# Patient Record
Sex: MALE | Race: WHITE | NOT HISPANIC OR LATINO | Employment: FULL TIME | URBAN - NONMETROPOLITAN AREA
[De-identification: names, ages, dates, MRNs, and addresses within clinical notes are randomized per-mention and may not be internally consistent; named-entity substitution may affect disease eponyms.]

---

## 2023-09-12 ENCOUNTER — OFFICE VISIT (OUTPATIENT)
Dept: PRIMARY CARE | Facility: CLINIC | Age: 23
End: 2023-09-12
Payer: COMMERCIAL

## 2023-09-12 VITALS
HEART RATE: 69 BPM | OXYGEN SATURATION: 98 % | SYSTOLIC BLOOD PRESSURE: 124 MMHG | BODY MASS INDEX: 40.46 KG/M2 | WEIGHT: 282.6 LBS | HEIGHT: 70 IN | DIASTOLIC BLOOD PRESSURE: 78 MMHG

## 2023-09-12 DIAGNOSIS — Z23 NEED FOR VACCINATION: ICD-10-CM
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
## 2023-09-12 DIAGNOSIS — S91.109A: Primary | ICD-10-CM

## 2023-09-12 PROCEDURE — 90471 IMMUNIZATION ADMIN: CPT

## 2023-09-12 PROCEDURE — 99213 OFFICE O/P EST LOW 20 MIN: CPT

## 2023-09-12 PROCEDURE — 90715 TDAP VACCINE 7 YRS/> IM: CPT

## 2023-09-12 RX ORDER — CIPROFLOXACIN 500 MG/1
750 TABLET ORAL 2 TIMES DAILY
Qty: 15 TABLET | Refills: 0 | Status: SHIPPED | OUTPATIENT
Start: 2023-09-12 | End: 2023-09-17

## 2023-09-12 NOTE — PROGRESS NOTES
"Subjective   Patient ID: Nik Hidalgo is a 23 y.o. male who presents for Puncture Wound (R foot).  HPI  Nik says he stepped on a nail last Tuesday.   Got nail out but since he stepped on it he has felt aching.  Got worse over the weekend.   Second toe swelled up and hurts.   Also over top of the foot hurts.  No numbness or tingling.  Cannot walk on it normally due to pain.  Pretty sure nail was anahi.  Had shoes on when it happened.  Tried epsom salt soak.  Warm milk and bread on it as well - did not hurt last night so seemed to help.  Betadine on it to clean it.  No drainage out of it.  Some reduced sensation in R foot 2nd toe due to swelling.     No past surgical history on file.   Past Medical History:   Diagnosis Date    Cutaneous abscess of abdominal wall 04/05/2021    Abscess of abdominal wall    Pain in left wrist 11/19/2018    Left wrist pain    Pain in right wrist 11/19/2018    Right wrist pain    Unspecified contact dermatitis due to other agents 11/19/2018    Contact dermatitis due to other agent    Unspecified fracture of the lower end of unspecified radius, initial encounter for closed fracture 11/19/2018    Distal radius fracture     Social History     Tobacco Use    Smoking status: Every Day     Packs/day: 1.5     Types: Cigarettes    Smokeless tobacco: Never   Substance Use Topics    Alcohol use: Never    Drug use: Never      Review of Systems  10 point review of systems performed and is negative except as noted in the HPI.    Current Outpatient Medications:     none    Objective   /78   Pulse 69   Ht 1.778 m (5' 10\")   Wt 128 kg (282 lb 9.6 oz)   SpO2 98%   BMI 40.55 kg/m²     Physical Exam  Musculoskeletal:      Right ankle: No swelling. No tenderness. Normal range of motion.      Left ankle: Normal. No swelling. No tenderness. Normal range of motion.      Left foot: Normal. Normal range of motion and normal capillary refill. No swelling or tenderness. Normal pulse.      Comments: " R Foot - healed area where nail went through plantar surface of R foot, no signs of infection where nail had entered skin. Second toe is erythematous, swollen, and tender to the touch. Erythema also present at base of metatarsals 1-4, tender when palpated, swelling present.   ROM intact in R toes but limited, especially in 2nd toe.   Remainder of foot is non-tender, ROM intact.   Dorsalis pedis and posterior tibalis pulses intact 2+. Capillary refill intact. Neurovascularly intact.       Assessment/Plan   Problem List Items Addressed This Visit    None  Visit Diagnoses       Wound of toe due to metal nail    -  Primary    Relevant Medications    ciprofloxacin (Cipro) 500 mg tablet    Other Relevant Orders    Tdap vaccine, age 10 years and older (BOOSTRIX) (Completed)    Need for vaccination        Relevant Orders    Tdap vaccine, age 10 years and older (BOOSTRIX) (Completed)        Start cipro   TDAP updated     Discussed at visit any disease processes that were of concern as well as the risks, benefits and instructions on any new medication provided. Patient (and/or caretaker of patient if present) stated all questions were answered, and they voiced understanding of instructions.

## 2023-09-13 PROBLEM — M25.561 ACUTE PAIN OF RIGHT KNEE: Status: RESOLVED | Noted: 2023-09-13 | Resolved: 2023-09-13

## 2023-09-13 PROBLEM — R53.83 FATIGUE: Status: RESOLVED | Noted: 2023-09-13 | Resolved: 2023-09-13

## 2023-09-13 PROBLEM — L02.211 ABSCESS OF ABDOMINAL WALL: Status: RESOLVED | Noted: 2023-09-13 | Resolved: 2023-09-13

## 2023-09-13 PROBLEM — S86.911A STRAIN OF RIGHT KNEE: Status: RESOLVED | Noted: 2023-09-13 | Resolved: 2023-09-13

## 2024-05-03 ENCOUNTER — OFFICE VISIT (OUTPATIENT)
Dept: PRIMARY CARE | Facility: CLINIC | Age: 24
End: 2024-05-03
Payer: COMMERCIAL

## 2024-05-03 VITALS
BODY MASS INDEX: 41.67 KG/M2 | SYSTOLIC BLOOD PRESSURE: 130 MMHG | DIASTOLIC BLOOD PRESSURE: 80 MMHG | OXYGEN SATURATION: 92 % | HEART RATE: 70 BPM | WEIGHT: 290.38 LBS

## 2024-05-03 DIAGNOSIS — F32.A DEPRESSION, UNSPECIFIED DEPRESSION TYPE: ICD-10-CM

## 2024-05-03 DIAGNOSIS — B95.8 STAPH INFECTION: Primary | ICD-10-CM

## 2024-05-03 PROCEDURE — 99213 OFFICE O/P EST LOW 20 MIN: CPT

## 2024-05-03 RX ORDER — SERTRALINE HYDROCHLORIDE 50 MG/1
50 TABLET, FILM COATED ORAL DAILY
Qty: 30 TABLET | Refills: 1 | Status: SHIPPED | OUTPATIENT
Start: 2024-05-03 | End: 2024-07-02

## 2024-05-03 RX ORDER — SERTRALINE HYDROCHLORIDE 25 MG/1
25 TABLET, FILM COATED ORAL DAILY
Qty: 7 TABLET | Refills: 0 | Status: SHIPPED | OUTPATIENT
Start: 2024-05-03 | End: 2024-05-10

## 2024-05-03 RX ORDER — MUPIROCIN 20 MG/G
OINTMENT TOPICAL 3 TIMES DAILY
Qty: 22 G | Refills: 0 | Status: SHIPPED | OUTPATIENT
Start: 2024-05-03 | End: 2024-05-13

## 2024-05-03 RX ORDER — SULFAMETHOXAZOLE AND TRIMETHOPRIM 800; 160 MG/1; MG/1
1 TABLET ORAL 2 TIMES DAILY
Qty: 20 TABLET | Refills: 0 | Status: SHIPPED | OUTPATIENT
Start: 2024-05-03 | End: 2024-05-13

## 2024-05-03 NOTE — PROGRESS NOTES
Subjective   Patient ID: Nik Hidalgo is a 23 y.o. male who presents for Follow-up (Possible staph infection on  inner thigh and upper leg).  HPI  Nik presents for several concerns:     First, around his waist he gets bigger lumps that open up - green stuff comes out, it heals, then scars  Hurts, relief when open up and drains  No fever   No one else has at home that he knows of   Before they open he puts on B&W and a bandaid   Sometimes itchy the next day     Also area on thighs, it is different it looks like a boil or cyst   Sometimes hurt     Would also like medication for depression   Feels like head is not in the right place  Feels short tempered at times  Sometimes harder to get along with people   Denies down or hopelessness  Sleep - tired after 8-10 hours of sleep   Appetite is normal   No SI or self harm thoughts       No past surgical history on file.   Past Medical History:   Diagnosis Date    Abscess of abdominal wall 09/13/2023    Acute pain of right knee 09/13/2023    Cutaneous abscess of abdominal wall 04/05/2021    Abscess of abdominal wall    Fatigue 09/13/2023    Pain in left wrist 11/19/2018    Left wrist pain    Pain in right wrist 11/19/2018    Right wrist pain    Strain of right knee 09/13/2023    Unspecified contact dermatitis due to other agents 11/19/2018    Contact dermatitis due to other agent    Unspecified fracture of the lower end of unspecified radius, initial encounter for closed fracture 11/19/2018    Distal radius fracture     Social History     Tobacco Use    Smoking status: Every Day     Current packs/day: 1.50     Types: Cigarettes    Smokeless tobacco: Never   Substance Use Topics    Alcohol use: Never    Drug use: Never        Review of Systems  10 point review of systems performed and is negative except as noted in the HPI.      Current Outpatient Medications:     mupirocin (Bactroban) 2 % ointment, Apply topically 3 times a day for 10 days. apply to affected area, Disp:  22 g, Rfl: 0    sertraline (Zoloft) 25 mg tablet, Take 1 tablet (25 mg) by mouth once daily for 7 days., Disp: 7 tablet, Rfl: 0    sertraline (Zoloft) 50 mg tablet, Take 1 tablet (50 mg) by mouth once daily., Disp: 30 tablet, Rfl: 1    sulfamethoxazole-trimethoprim (Bactrim DS) 800-160 mg tablet, Take 1 tablet by mouth 2 times a day for 10 days., Disp: 20 tablet, Rfl: 0     Objective   /80   Pulse 70   Wt 132 kg (290 lb 6.1 oz)   SpO2 92%   BMI 41.67 kg/m²     Physical Exam  Constitutional:       General: He is not in acute distress.     Appearance: Normal appearance. He is obese. He is not ill-appearing.   HENT:      Head: Normocephalic and atraumatic.   Skin:            Comments: Healed lesions present along waist line - some violet in color. New pustules also present, none are open or draining but have fluctuance.   Lesions also present on L inner thigh, appeared to be healed but mildly tender to palpation.   Also small flesh colored lesion on upper L chest, no drainage.    Neurological:      Mental Status: He is alert and oriented to person, place, and time.   Psychiatric:         Attention and Perception: Attention and perception normal.         Mood and Affect: Affect normal. Mood is depressed. Mood is not anxious.         Speech: Speech normal.         Behavior: Behavior normal. Behavior is cooperative.         Thought Content: Thought content normal. Thought content does not include homicidal or suicidal ideation.         Judgment: Judgment normal.         Assessment/Plan   Problem List Items Addressed This Visit    None  Visit Diagnoses       Staph infection    -  Primary    Relevant Medications    sulfamethoxazole-trimethoprim (Bactrim DS) 800-160 mg tablet    mupirocin (Bactroban) 2 % ointment    Depression, unspecified depression type        Relevant Medications    sertraline (Zoloft) 25 mg tablet    sertraline (Zoloft) 50 mg tablet          Staph infection   Bactrim now   If new spots appear  try mupirocin after completion of bactrim     Depression   Start sertraline 25mg for 1 week then 50  Follow up in 4-6 weeks to see how he is doing       Discussed at visit any disease processes that were of concern as well as the risks, benefits and instructions on any new medication provided. Patient (and/or caretaker of patient if present) stated all questions were answered, and they voiced understanding of instructions.

## 2024-05-03 NOTE — PATIENT INSTRUCTIONS
Start the antibiotic   Use the ointment when you see a new spot forming   If no improvement then come back and be seen again, may need biopsy  Also keep an eye on the spot on your chest, if it does not improve then need a biopsy of that area     Start sertraline 25mg once a day for a week  Then start sertraline 50mg one daily   Follow up in 4-6 weeks   Can take 4-6 weeks to fully notice an effect

## 2024-07-06 DIAGNOSIS — F32.A DEPRESSION, UNSPECIFIED DEPRESSION TYPE: ICD-10-CM

## 2024-07-08 RX ORDER — SERTRALINE HYDROCHLORIDE 50 MG/1
50 TABLET, FILM COATED ORAL DAILY
Qty: 30 TABLET | Refills: 1 | Status: SHIPPED | OUTPATIENT
Start: 2024-07-08

## 2024-09-23 DIAGNOSIS — F32.A DEPRESSION, UNSPECIFIED DEPRESSION TYPE: ICD-10-CM

## 2024-09-24 RX ORDER — SERTRALINE HYDROCHLORIDE 50 MG/1
50 TABLET, FILM COATED ORAL DAILY
Qty: 90 TABLET | Refills: 1 | Status: SHIPPED | OUTPATIENT
Start: 2024-09-24

## 2025-06-27 DIAGNOSIS — F32.A DEPRESSION, UNSPECIFIED DEPRESSION TYPE: ICD-10-CM

## 2025-06-30 RX ORDER — SERTRALINE HYDROCHLORIDE 50 MG/1
50 TABLET, FILM COATED ORAL DAILY
Qty: 90 TABLET | Refills: 0 | OUTPATIENT
Start: 2025-06-30

## 2025-07-01 DIAGNOSIS — F32.A DEPRESSION, UNSPECIFIED DEPRESSION TYPE: ICD-10-CM

## 2025-07-01 RX ORDER — SERTRALINE HYDROCHLORIDE 50 MG/1
50 TABLET, FILM COATED ORAL DAILY
Qty: 30 TABLET | Refills: 0 | Status: SHIPPED | OUTPATIENT
Start: 2025-07-01

## 2025-07-07 ENCOUNTER — APPOINTMENT (OUTPATIENT)
Dept: PRIMARY CARE | Facility: CLINIC | Age: 25
End: 2025-07-07
Payer: COMMERCIAL

## 2025-07-07 VITALS
HEART RATE: 80 BPM | SYSTOLIC BLOOD PRESSURE: 115 MMHG | WEIGHT: 307 LBS | OXYGEN SATURATION: 93 % | HEIGHT: 70 IN | BODY MASS INDEX: 43.95 KG/M2 | DIASTOLIC BLOOD PRESSURE: 79 MMHG

## 2025-07-07 DIAGNOSIS — F32.A DEPRESSION, UNSPECIFIED DEPRESSION TYPE: Primary | ICD-10-CM

## 2025-07-07 DIAGNOSIS — R10.84 GENERALIZED ABDOMINAL DISCOMFORT: ICD-10-CM

## 2025-07-07 DIAGNOSIS — R19.7 DIARRHEA, UNSPECIFIED TYPE: ICD-10-CM

## 2025-07-07 PROCEDURE — 99214 OFFICE O/P EST MOD 30 MIN: CPT

## 2025-07-07 PROCEDURE — G8433 SCR FOR DEP NOT CPT DOC RSN: HCPCS

## 2025-07-07 PROCEDURE — 3008F BODY MASS INDEX DOCD: CPT

## 2025-07-07 RX ORDER — PANTOPRAZOLE SODIUM 40 MG/1
40 TABLET, DELAYED RELEASE ORAL DAILY
Qty: 30 TABLET | Refills: 1 | Status: SHIPPED | OUTPATIENT
Start: 2025-07-07 | End: 2025-09-05

## 2025-07-07 RX ORDER — SERTRALINE HYDROCHLORIDE 100 MG/1
100 TABLET, FILM COATED ORAL DAILY
Qty: 30 TABLET | Refills: 1 | Status: SHIPPED | OUTPATIENT
Start: 2025-07-07 | End: 2025-09-05

## 2025-07-07 ASSESSMENT — PATIENT HEALTH QUESTIONNAIRE - PHQ9
1. LITTLE INTEREST OR PLEASURE IN DOING THINGS: SEVERAL DAYS
SUM OF ALL RESPONSES TO PHQ9 QUESTIONS 1 AND 2: 2
2. FEELING DOWN, DEPRESSED OR HOPELESS: SEVERAL DAYS

## 2025-07-07 NOTE — PATIENT INSTRUCTIONS
Stool studies - bring these back as soon as you can     Try pantoprazole once a day, see I this helps, if it does then it is likely related to reflux, if not then it is not     Try no dairy in your diet for 1 week and see if this helps     Continue probiotics     Increase sertraline to 100mg one a day   1 month follow up

## 2025-07-07 NOTE — PROGRESS NOTES
"Subjective   Patient ID: Nik Hidalgo is a 25 y.o. male who presents for Annual Exam and Abdominal Pain (2 months having loose bm and more times a day and he is taking probiotics ).  HPI  Nik presents for abdominal pain for 2 months or so   -It is 5 days a week   -It comes and goes   -Pain is right in the center, does not move  -The pain feels like how it is when you are nervous but more intense   -Not related to eating, waits until after it is gone to eat   -Taking some probiotics (unknown name) they have helped   -Sometimes feels like he could take tums but has not tried   -Some times gets diarrhea, not every time he gets the pain, first diarrhea was every day, now it is 3-4 times a week   -Still going to the bathroom more than usual   -Has not noticed blood, denies changes in color   -No change in diet, no recent travel   -Does not drink raw milk   -Is not around chickens or other animals, just dogs   -No known colon CA in the family  -Tried cutting out gluten and it did not help   -Some reflux but not worse than normal   -No fevers, no chills   -Has thrown up, yesterday morning a couple minutes after eating had to go throw up   -A month ago, took 3 bites out of burger had to throw up   -Did not feel nauseous and does not   -Denies feeling food get stuck     2 weeks ago was on doxycyline     Depression   -Last 2 months his medication seems to not be working as well  -More stressed   -Not as easy to stay calm   -Was working for awhile   -No side effects   -No SI     Surgical History[1]   Medical History[2]  Social History[3]     Review of Systems  10 point review of systems performed and is negative except as noted in the HPI.    Current Medications[4]     Objective   /79   Pulse 80   Ht 1.778 m (5' 10\")   Wt 139 kg (307 lb)   SpO2 93%   BMI 44.05 kg/m²     Physical Exam  Constitutional:       Appearance: Normal appearance.   HENT:      Head: Normocephalic and atraumatic.      Nose: Nose normal. "      Mouth/Throat:      Mouth: Mucous membranes are moist.      Pharynx: Oropharynx is clear.   Eyes:      Conjunctiva/sclera: Conjunctivae normal.      Pupils: Pupils are equal, round, and reactive to light.   Cardiovascular:      Rate and Rhythm: Normal rate and regular rhythm.      Heart sounds: Normal heart sounds.   Pulmonary:      Effort: Pulmonary effort is normal.      Breath sounds: Normal breath sounds. No wheezing, rhonchi or rales.   Abdominal:      General: Bowel sounds are normal.      Palpations: Abdomen is soft.      Tenderness: There is no abdominal tenderness. There is no guarding or rebound. Negative signs include Rovsing's sign.   Musculoskeletal:         General: Normal range of motion.   Skin:     General: Skin is warm and dry.   Neurological:      Mental Status: He is alert and oriented to person, place, and time.   Psychiatric:         Attention and Perception: Attention normal.         Mood and Affect: Mood and affect normal.         Speech: Speech normal.         Behavior: Behavior normal. Behavior is cooperative.         Thought Content: Thought content normal. Thought content does not include homicidal or suicidal ideation.         Judgment: Judgment normal.         Assessment & Plan  Depression, unspecified depression type  Increase sertraline to 100mg, 50mg was working well in the past   1 month follow up   Orders:    sertraline (Zoloft) 100 mg tablet; Take 1 tablet (100 mg) by mouth once daily.    Diarrhea, unspecified type  Discussed stool studies first, given recent antibiotic use we do need to do c-diff studies   Also discussed trying an elimination diet, cut out dairy for 1 week and see if there is any improvement in symptoms  Continue probiotics   Orders:    C. difficile, PCR; Future    Stool Pathogen Panel, PCR; Future    Fecal Fat, Quantitative; Future    Calprotectin Stool; Future    Ova/Para + Giardia/Cryptosporidium Antigen; Future    Generalized abdominal discomfort  Did  discuss reflux, will try protonix to see if this helps at all  Also consider bentyl   Also consider abdominal US or CT if no improvement   Orders:    pantoprazole (ProtoNix) 40 mg EC tablet; Take 1 tablet (40 mg) by mouth once daily. Do not crush, chew, or split.          Discussed at visit any disease processes that were of concern as well as the risks, benefits and instructions on any new medication provided. Patient (and/or caretaker of patient if present) stated all questions were answered, and they voiced understanding of instructions.      Charmaine Maldonado PA-C       [1] History reviewed. No pertinent surgical history.  [2]   Past Medical History:  Diagnosis Date    Abscess of abdominal wall 09/13/2023    Acute pain of right knee 09/13/2023    Cutaneous abscess of abdominal wall 04/05/2021    Abscess of abdominal wall    Fatigue 09/13/2023    Pain in left wrist 11/19/2018    Left wrist pain    Pain in right wrist 11/19/2018    Right wrist pain    Strain of right knee 09/13/2023    Unspecified contact dermatitis due to other agents 11/19/2018    Contact dermatitis due to other agent    Unspecified fracture of the lower end of unspecified radius, initial encounter for closed fracture 11/19/2018    Distal radius fracture   [3]   Social History  Tobacco Use    Smoking status: Every Day     Current packs/day: 1.50     Types: Cigarettes    Smokeless tobacco: Never   Vaping Use    Vaping status: Some Days   Substance Use Topics    Alcohol use: Never    Drug use: Never   [4]   Current Outpatient Medications:     pantoprazole (ProtoNix) 40 mg EC tablet, Take 1 tablet (40 mg) by mouth once daily. Do not crush, chew, or split., Disp: 30 tablet, Rfl: 1    sertraline (Zoloft) 100 mg tablet, Take 1 tablet (100 mg) by mouth once daily., Disp: 30 tablet, Rfl: 1

## 2025-08-05 ENCOUNTER — APPOINTMENT (OUTPATIENT)
Dept: PRIMARY CARE | Facility: CLINIC | Age: 25
End: 2025-08-05
Payer: COMMERCIAL

## 2025-08-05 DIAGNOSIS — R10.84 GENERALIZED ABDOMINAL DISCOMFORT: ICD-10-CM

## 2025-08-05 DIAGNOSIS — F32.A DEPRESSION, UNSPECIFIED DEPRESSION TYPE: ICD-10-CM

## 2025-08-05 PROCEDURE — 98012 SYNCH AUDIO-ONLY EST SF 10: CPT

## 2025-08-05 RX ORDER — PANTOPRAZOLE SODIUM 40 MG/1
40 TABLET, DELAYED RELEASE ORAL DAILY
Qty: 90 TABLET | Refills: 3 | Status: SHIPPED | OUTPATIENT
Start: 2025-08-05 | End: 2026-07-31

## 2025-08-05 RX ORDER — SERTRALINE HYDROCHLORIDE 100 MG/1
100 TABLET, FILM COATED ORAL DAILY
Qty: 90 TABLET | Refills: 3 | Status: SHIPPED | OUTPATIENT
Start: 2025-08-05 | End: 2026-07-31

## 2025-08-05 NOTE — PROGRESS NOTES
Subjective   Patient ID: Nik Hidalgo is a 25 y.o. male who presents for Depression and stomach discomfort.  HPI  Nik presents via phone visit for 1 month follow up   -At his last visit we increased sertraline to 100mg  -It is working well   -No side effects   -Stress level is better   -Sleep is good     Diarrhea and stomach discomfort   -Has improved a lot   -Protonix and probiotics, both are providing relief   -No diarrhea episodes   -Every once and awhile gets stomach discomfort   -No reflux      Current Medications[1]   Surgical History[2]   Medical History[3]  Social History[4]   Family History[5]   Review of Systems  10 point ROS negative except as otherwise noted in the HPI.      Objective   There were no vitals taken for this visit.   Physical Exam  Limited exam due to nature of virtual visit.      Assessment/Plan   Problem List Items Addressed This Visit    None  Visit Diagnoses         Depression, unspecified depression type        Relevant Medications    sertraline (Zoloft) 100 mg tablet - continue       Generalized abdominal discomfort        Relevant Medications    pantoprazole (ProtoNix) 40 mg EC tablet - continue  -did discuss a further work up but he is getting relief with PPI, discussed GERD  -follow up in person if not improving             Virtual or Telephone Consent - Verbal consent was requested and obtained from the patient on this date for a telehealth visit. The patient was informed about the telehealth clinical encounter including benefits to avoiding travel, limitations to the assessment, and billing for the service. In person care may be recommended if needed. Telehealth sessions are not being recorded and personal health information is protected. All questions were answered and verbal informal consent was obtained from the patient to proceed.    While technically available, the patient was unable or unwilling to consent to connect via audio/video telehealth technology; therefore, I  performed this visit using a real-time audio only connection between Nik Hidalgo & Charmaine Maldonado PA-C.  Verbal consent was requested and obtained from Nik Hidalgo on this date, 8/5/25 for a telehealth visit and the patient's location was confirmed at the time of the visit.       Total time spent reviewing the patient's chart, on the phone with the patient, and documenting: 10 minutes    Discussed at visit any disease processes that were of concern as well as the risks, benefits and instructions on any new medication provided. Patient (and/or caretaker of patient if present) stated all questions were answered, and they voiced understanding of instructions.     Charmaine Maldonado PA-C          [1]   Current Outpatient Medications:     pantoprazole (ProtoNix) 40 mg EC tablet, Take 1 tablet (40 mg) by mouth once daily. Do not crush, chew, or split., Disp: 90 tablet, Rfl: 3    sertraline (Zoloft) 100 mg tablet, Take 1 tablet (100 mg) by mouth once daily., Disp: 90 tablet, Rfl: 3  [2] No past surgical history on file.  [3]   Past Medical History:  Diagnosis Date    Abscess of abdominal wall 09/13/2023    Acute pain of right knee 09/13/2023    Cutaneous abscess of abdominal wall 04/05/2021    Abscess of abdominal wall    Fatigue 09/13/2023    Pain in left wrist 11/19/2018    Left wrist pain    Pain in right wrist 11/19/2018    Right wrist pain    Strain of right knee 09/13/2023    Unspecified contact dermatitis due to other agents 11/19/2018    Contact dermatitis due to other agent    Unspecified fracture of the lower end of unspecified radius, initial encounter for closed fracture 11/19/2018    Distal radius fracture   [4]   Social History  Tobacco Use    Smoking status: Every Day     Current packs/day: 1.50     Types: Cigarettes    Smokeless tobacco: Never   Vaping Use    Vaping status: Some Days   Substance Use Topics    Alcohol use: Never    Drug use: Never   [5] No family history on file.